# Patient Record
Sex: FEMALE | Race: WHITE | NOT HISPANIC OR LATINO | Employment: UNEMPLOYED | URBAN - METROPOLITAN AREA
[De-identification: names, ages, dates, MRNs, and addresses within clinical notes are randomized per-mention and may not be internally consistent; named-entity substitution may affect disease eponyms.]

---
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
## 2025-01-21 ENCOUNTER — TELEPHONE (OUTPATIENT)
Dept: FAMILY MEDICINE CLINIC | Facility: CLINIC | Age: 20
End: 2025-01-21

## 2025-01-21 NOTE — TELEPHONE ENCOUNTER
Need to reschedule 1/27 appointment as Dr Merida not in. Called cell#- not in service. Called home # and left message to return call.

## 2025-01-31 DIAGNOSIS — F31.81 BIPOLAR 2 DISORDER (HCC): ICD-10-CM

## 2025-01-31 RX ORDER — LAMOTRIGINE 25 MG/1
50 TABLET ORAL DAILY
Qty: 180 TABLET | Refills: 0 | Status: SHIPPED | OUTPATIENT
Start: 2025-01-31 | End: 2025-05-01

## 2025-02-06 ENCOUNTER — OFFICE VISIT (OUTPATIENT)
Dept: FAMILY MEDICINE CLINIC | Facility: CLINIC | Age: 20
End: 2025-02-06
Payer: COMMERCIAL

## 2025-02-06 VITALS
DIASTOLIC BLOOD PRESSURE: 70 MMHG | TEMPERATURE: 98.4 F | SYSTOLIC BLOOD PRESSURE: 100 MMHG | HEIGHT: 61 IN | BODY MASS INDEX: 29.57 KG/M2 | WEIGHT: 156.6 LBS | OXYGEN SATURATION: 97 % | RESPIRATION RATE: 18 BRPM | HEART RATE: 108 BPM

## 2025-02-06 DIAGNOSIS — F31.81 BIPOLAR 2 DISORDER (HCC): Primary | ICD-10-CM

## 2025-02-06 DIAGNOSIS — M79.10 MUSCLE PAIN: ICD-10-CM

## 2025-02-06 DIAGNOSIS — B35.3 TINEA PEDIS OF BOTH FEET: ICD-10-CM

## 2025-02-06 DIAGNOSIS — M54.50 LOW BACK PAIN WITHOUT SCIATICA, UNSPECIFIED BACK PAIN LATERALITY, UNSPECIFIED CHRONICITY: ICD-10-CM

## 2025-02-06 PROCEDURE — 99214 OFFICE O/P EST MOD 30 MIN: CPT | Performed by: FAMILY MEDICINE

## 2025-02-06 NOTE — PROGRESS NOTES
Angela Foreman 2005 adult MRN: [id number]    [facility] OFFICE VISIT  [facility] - [facility]      ASSESSMENT/PLAN  Angela Foreman is a 19 y.o. adult presents to the office for    Diagnoses and all orders for this visit:    Bipolar 2 disorder (HCC)    Muscle pain    Low back pain without sciatica, unspecified back pain laterality, unspecified chronicity    Tinea pedis of both feet      Bipolar type II noncompliant with his meds at times  At this time his girlfriend is in the office and I do recommend that he take this faithfully in order to get the best benefits out of the medication.  Patient is agreeable  Tinea pedis recommend Lotrimin spray and avoiding use of socks  Muscle pain recommend chiropractor or physical therapy if no improvement           Future Appointments   Date Time Provider Department Center   3/6/2025  3:15 PM Susie Mackey MD [facility]          SUBJECTIVE  CC: Follow-up      HPI:  Angela Foreman is a 19 y.o. adult who presents for an follow-up appointment.  Patient states that he has been taking his medications  At times.  Patient states that he has been getting reminders from his girlfriend and mom has been having  agitation at times.  Does complain about fungus on feet bilateral.  Patient also complaining of back pain still being persistent      Review of Systems   Constitutional:  Negative for activity change, appetite change, chills, fatigue and fever.   HENT:  Negative for congestion.    Respiratory:  Negative for cough, chest tightness and shortness of breath.    Cardiovascular:  Negative for chest pain and leg swelling.   Gastrointestinal:  Negative for abdominal distention, abdominal pain, constipation, diarrhea, nausea and vomiting.   Musculoskeletal:  Positive for back pain.   Psychiatric/Behavioral:  The patient is nervous/anxious.    All other systems reviewed and are negative.      Historical Information   The  patient history was reviewed as follows:  Past Medical History:   Diagnosis Date   • ADHD (attention deficit hyperactivity disorder)    • Allergic rhinitis    • Anxiety    • Bipolar 1 disorder, depressed, severe (HCC)    • GERD (gastroesophageal reflux disease)    • Lactose intolerance          Medications:     Current Outpatient Medications:   •  albuterol (PROVENTIL HFA,VENTOLIN HFA) 90 mcg/act inhaler, Inhale 2 puffs every 6 (six) hours as needed for wheezing or shortness of breath (Patient not taking: Reported on 12/26/2024), Disp: 18 g, Rfl: 2  •  cetirizine (ZyrTEC) 10 mg tablet, Take 10 mg by mouth daily (Patient not taking: Reported on 12/26/2024), Disp: , Rfl:   •  cyclobenzaprine (FLEXERIL) 5 mg tablet, Take 1 tablet (5 mg total) by mouth daily at bedtime (Patient not taking: Reported on 12/26/2024), Disp: 30 tablet, Rfl: 0  •  famotidine (PEPCID) 20 mg tablet, Take 1 tablet (20 mg total) by mouth 2 (two) times a day (Patient not taking: Reported on 12/26/2024), Disp: 60 tablet, Rfl: 5  •  FLUoxetine (PROzac) 20 mg capsule, Take 1 capsule (20 mg total) by mouth daily, Disp: 90 capsule, Rfl: 0  •  FLUoxetine (PROzac) 40 MG capsule, Take 60 mg by mouth daily, Disp: , Rfl:   •  fluticasone (FLONASE) 50 mcg/act nasal spray, 1 spray into each nostril daily (Patient not taking: Reported on 12/26/2024), Disp: 18.2 mL, Rfl: 0  •  hydrOXYzine HCL (ATARAX) 50 mg tablet, Take 50 mg by mouth every 8 (eight) hours as needed (Patient not taking: Reported on 12/26/2024), Disp: , Rfl:   •  lamoTRIgine (LaMICtal) 150 MG tablet, Take 150 mg by mouth daily (Patient not taking: Reported on 12/26/2024), Disp: , Rfl:   •  lamoTRIgine (LaMICtal) 25 mg tablet, Take 2 tablets (50 mg total) by mouth daily, Disp: 180 tablet, Rfl: 0  •  lurasidone (LATUDA) 40 mg tablet, Take 40 mg by mouth daily with breakfast (Patient not taking: Reported on 12/26/2024), Disp: , Rfl:   •  montelukast (SINGULAIR) 10 mg tablet, Take 1 tablet (10 mg  "total) by mouth daily at bedtime (Patient not taking: Reported on 12/26/2024), Disp: 30 tablet, Rfl: 5    Allergies   Allergen Reactions   • Penicillins GI Intolerance   • Zithromax Tri-Kael [Azithromycin] GI Intolerance   • Cat Dander Dermatitis   • Pollen Extract Allergic Rhinitis       OBJECTIVE  Vitals:   Vitals:    02/06/25 1457   BP: 100/70   BP Location: Left arm   Patient Position: Sitting   Cuff Size: Standard   Pulse: (!) 108   Resp: 18   Temp: 98.4 °F (36.9 °C)   TempSrc: Temporal   SpO2: 97%   Weight: 71 kg (156 lb 9.6 oz)   Height: 5' 1\" (1.549 m)         Physical Exam  Constitutional:       Appearance: Normal appearance.   Pulmonary:      Effort: Pulmonary effort is normal.   Musculoskeletal:         General: Tenderness present. Normal range of motion.      Comments: Upper and lower back   Neurological:      General: No focal deficit present.      Mental Status: He is alert and oriented to person, place, and time.   Psychiatric:         Mood and Affect: Mood normal.         Behavior: Behavior normal.         Thought Content: Thought content normal.         Judgment: Judgment normal.                    Susie Mackey MD,   [facility]  2/10/2025      "

## 2025-04-05 DIAGNOSIS — T78.40XS ALLERGY, SEQUELA: ICD-10-CM

## 2025-04-07 RX ORDER — MONTELUKAST SODIUM 10 MG/1
10 TABLET ORAL
Qty: 30 TABLET | Refills: 5 | Status: SHIPPED | OUTPATIENT
Start: 2025-04-07 | End: 2025-04-19 | Stop reason: SDUPTHER

## 2025-04-14 ENCOUNTER — OFFICE VISIT (OUTPATIENT)
Dept: OBGYN CLINIC | Facility: CLINIC | Age: 20
End: 2025-04-14

## 2025-04-14 VITALS
BODY MASS INDEX: 30.21 KG/M2 | WEIGHT: 160 LBS | DIASTOLIC BLOOD PRESSURE: 78 MMHG | HEIGHT: 61 IN | SYSTOLIC BLOOD PRESSURE: 110 MMHG

## 2025-04-14 DIAGNOSIS — N76.0 ACUTE VAGINITIS: Primary | ICD-10-CM

## 2025-04-14 PROBLEM — G40.909 SEIZURE DISORDER (HCC): Status: ACTIVE | Noted: 2025-04-14

## 2025-04-14 PROBLEM — F10.24 ALCOHOL DEPENDENCE WITH ALCOHOL-INDUCED MOOD DISORDER (HCC): Status: ACTIVE | Noted: 2025-04-14

## 2025-04-14 NOTE — PROGRESS NOTES
"Name: Angela Foreman      : 2005      MRN: [id number]  Encounter Provider: MARTI Cooley  Encounter Date: 2025   Encounter department: [facility] [place]  :  Assessment & Plan  Acute vaginitis  -Vaginal culture collected; will treat based on results.  -Normal pelvic exam today except for small cut at vaginal introitus and mild irritation to right labia   -Recommended A&D ointment to cut for barrier and healing  -Advised coconut oil to vulva for irritation.   Orders:    NuSwab Vaginitis Plus (VG+)        History of Present Illness   HPI  Jovany Foreman is a 19 y.o. adult who presents with vaginal concerns. Pronouns \"he/him\".    Symptoms started approximately 4-5 days ago.  He reports he is prone to yeast infections.  Denies abnormal discharge   + abnormal odor: \"off\"  + vulvar irritation  + vaginal itching   He reports feeling raw.   Patient is  sexually active, with male and female partners.   Condom use: yes  Last STI screening: n/a  History of STI/STD: no  Denies changes in lifestyle, medication, soaps, detergents.       History obtained from: patient    Review of Systems  Medical History Reviewed by provider this encounter:     .  Current Outpatient Medications on File Prior to Visit   Medication Sig Dispense Refill    albuterol (PROVENTIL HFA,VENTOLIN HFA) 90 mcg/act inhaler Inhale 2 puffs every 6 (six) hours as needed for wheezing or shortness of breath 18 g 2    famotidine (PEPCID) 20 mg tablet Take 1 tablet (20 mg total) by mouth 2 (two) times a day 60 tablet 5    FLUoxetine (PROzac) 40 MG capsule Take 60 mg by mouth daily      fluticasone (FLONASE) 50 mcg/act nasal spray 1 spray into each nostril daily 18.2 mL 0    hydrOXYzine HCL (ATARAX) 50 mg tablet Take 50 mg by mouth every 8 (eight) hours as needed      lamoTRIgine (LaMICtal) 150 MG tablet Take 150 mg by mouth daily      lurasidone (LATUDA) 40 mg tablet Take 40 mg by mouth daily with breakfast      " "montelukast (SINGULAIR) 10 mg tablet TAKE 1 TABLET BY MOUTH ONCE DAILY AT BEDTIME 30 tablet 5    cetirizine (ZyrTEC) 10 mg tablet Take 10 mg by mouth daily (Patient not taking: Reported on 4/14/2025)      cyclobenzaprine (FLEXERIL) 5 mg tablet Take 1 tablet (5 mg total) by mouth daily at bedtime (Patient not taking: Reported on 4/14/2025) 30 tablet 0    FLUoxetine (PROzac) 20 mg capsule Take 1 capsule (20 mg total) by mouth daily 90 capsule 0    lamoTRIgine (LaMICtal) 25 mg tablet Take 2 tablets (50 mg total) by mouth daily (Patient not taking: Reported on 4/14/2025) 180 tablet 0     No current facility-administered medications on file prior to visit.      Social History     Tobacco Use    Smoking status: Every Day     Current packs/day: 0.25     Types: Cigarettes    Smokeless tobacco: Never   Vaping Use    Vaping status: Never Used   Substance and Sexual Activity    Alcohol use: Yes     Comment: socially    Drug use: Yes     Types: Marijuana     Comment: last used 2 wks ago    Sexual activity: Yes     Partners: Female, Male     Birth control/protection: Condom Male        Objective   /78 (BP Location: Left arm, Patient Position: Sitting, Cuff Size: Standard)   Ht 5' 1\" (1.549 m)   Wt 72.6 kg (160 lb)   LMP 04/02/2025 (Approximate)   BMI 30.23 kg/m²      Physical Exam  Vitals and nursing note reviewed.   Constitutional:       General: He is not in acute distress.     Appearance: Normal appearance.   HENT:      Head: Normocephalic.   Eyes:      Conjunctiva/sclera: Conjunctivae normal.   Pulmonary:      Effort: No respiratory distress.   Abdominal:      General: Abdomen is flat.      Palpations: Abdomen is soft.   Genitourinary:     General: Normal vulva.      Exam position: Lithotomy position.      Pubic Area: No rash or pubic lice.       Labia:         Right: No rash or tenderness.         Left: No rash or tenderness.       Urethra: No urethral pain.      Vagina: Normal.      Cervix: Normal.      Uterus: " Normal.       Adnexa: Right adnexa normal and left adnexa normal.          Comments: Mild irritation noted on right labia. Minimal normal physiologic discharge noted in posterior vaginal vault.  Musculoskeletal:         General: Normal range of motion.      Cervical back: Normal range of motion.      Right lower leg: No edema.      Left lower leg: No edema.   Lymphadenopathy:      Lower Body: No right inguinal adenopathy. No left inguinal adenopathy.   Skin:     General: Skin is warm and dry.   Neurological:      Mental Status: He is alert and oriented to person, place, and time.   Psychiatric:         Mood and Affect: Mood normal.         Behavior: Behavior normal.         Thought Content: Thought content normal.         Judgment: Judgment normal.

## 2025-04-17 ENCOUNTER — RESULTS FOLLOW-UP (OUTPATIENT)
Dept: OBGYN CLINIC | Facility: CLINIC | Age: 20
End: 2025-04-17

## 2025-04-17 LAB
A VAGINAE DNA VAG QL NAA+PROBE: NORMAL SCORE
BVAB2 DNA VAG QL NAA+PROBE: NORMAL SCORE
C ALBICANS DNA VAG QL NAA+PROBE: NEGATIVE
C GLABRATA DNA VAG QL NAA+PROBE: NEGATIVE
C TRACH RRNA SPEC QL NAA+PROBE: NEGATIVE
MEGA1 DNA VAG QL NAA+PROBE: NORMAL SCORE
N GONORRHOEA RRNA SPEC QL NAA+PROBE: NEGATIVE
T VAGINALIS RRNA SPEC QL NAA+PROBE: NEGATIVE

## 2025-04-19 DIAGNOSIS — T78.40XS ALLERGY, SEQUELA: ICD-10-CM

## 2025-04-20 RX ORDER — MONTELUKAST SODIUM 10 MG/1
10 TABLET ORAL
Qty: 90 TABLET | Refills: 1 | Status: SHIPPED | OUTPATIENT
Start: 2025-04-20

## 2025-06-06 ENCOUNTER — OFFICE VISIT (OUTPATIENT)
Dept: URGENT CARE | Facility: CLINIC | Age: 20
End: 2025-06-06
Payer: COMMERCIAL

## 2025-06-06 VITALS
RESPIRATION RATE: 20 BRPM | OXYGEN SATURATION: 98 % | TEMPERATURE: 97.3 F | WEIGHT: 157.6 LBS | DIASTOLIC BLOOD PRESSURE: 65 MMHG | BODY MASS INDEX: 29.78 KG/M2 | HEART RATE: 92 BPM | SYSTOLIC BLOOD PRESSURE: 105 MMHG

## 2025-06-06 DIAGNOSIS — J01.00 ACUTE NON-RECURRENT MAXILLARY SINUSITIS: Primary | ICD-10-CM

## 2025-06-06 PROCEDURE — 99203 OFFICE O/P NEW LOW 30 MIN: CPT

## 2025-06-06 RX ORDER — DOXYCYCLINE 100 MG/1
100 TABLET ORAL 2 TIMES DAILY
Qty: 10 TABLET | Refills: 0 | Status: SHIPPED | OUTPATIENT
Start: 2025-06-06 | End: 2025-06-11

## 2025-06-06 RX ORDER — PREDNISONE 20 MG/1
40 TABLET ORAL DAILY
Qty: 10 TABLET | Refills: 0 | Status: SHIPPED | OUTPATIENT
Start: 2025-06-06 | End: 2025-06-11

## 2025-06-06 NOTE — PROGRESS NOTES
"  [facility]        NAME: Angela Foreman is a 19 y.o. adult  : 2005    MRN: [id number]  DATE: 2025  TIME: 7:30 PM    Assessment and Plan   Acute non-recurrent maxillary sinusitis [J01.00]  1. Acute non-recurrent maxillary sinusitis  doxycycline (ADOXA) 100 MG tablet    predniSONE 20 mg tablet          Worsening sinusitis symptoms x 8 days with severe facial pain and purulent nasal discharge. Will treat.     Patient Instructions     Supportive care with rest, adequate hydration, and warm liquids   Antibiotics as prescribed  Over the counter Tylenol/acetaminophen as needed for fever  Over the counter cold/cough medicine as needed    Follow up with PCP in 3-5 days   Go to ER if worsening symptoms    If tests are performed, our office will contact you with results only if changes need to made to the care plan discussed with you at the visit. [facility].    Chief Complaint     Chief Complaint   Patient presents with    Sore Throat     Reports sore throat, \"bad\" headache, congestion, cough with yellow-brown mucus, facial pain and burning x 8 days that has been worsening. States other members of the household are also sick with similar symptoms. Using ibuprofen. States he feels he may have a \"sinus infection.\"         History of Present Illness       Sinusitis  This is a new problem. The current episode started 1 to 4 weeks ago. The problem has been gradually worsening since onset. There has been no fever. The pain is severe. Associated symptoms include congestion, coughing, headaches, sinus pressure and a sore throat. Pertinent negatives include no chills or shortness of breath. Treatments tried: ibuprofen.       Review of Systems   Review of Systems   Constitutional:  Positive for fatigue. Negative for chills and fever.   HENT:  Positive for congestion, sinus pressure, sinus pain and sore throat. Negative for postnasal drip, rhinorrhea and trouble " swallowing.    Respiratory:  Positive for cough. Negative for chest tightness and shortness of breath.    Cardiovascular:  Negative for chest pain and palpitations.   Gastrointestinal:  Negative for abdominal pain, nausea and vomiting.   Genitourinary:  Negative for difficulty urinating.   Musculoskeletal:  Negative for myalgias.   Neurological:  Positive for headaches. Negative for dizziness.         Current Medications     Current Medications[1]    Current Allergies     Allergies as of 06/06/2025 - Reviewed 06/06/2025   Allergen Reaction Noted    Penicillins GI Intolerance 09/17/2019    Zithromax tri-roselyn [azithromycin] GI Intolerance 11/03/2022    Cat dander Dermatitis 08/10/2024    Pollen extract Allergic Rhinitis 08/10/2024            The following portions of the patient's history were reviewed and updated as appropriate: allergies, current medications, past family history, past medical history, past social history, past surgical history and problem list.     Past Medical History[2]    Past Surgical History[3]    Family History[4]      Medications have been verified.        Objective   /65   Pulse 92   Temp (!) 97.3 °F (36.3 °C) (Tympanic)   Resp 20   Wt 71.5 kg (157 lb 9.6 oz)   LMP 05/06/2025 (Approximate)   SpO2 98%   BMI 29.78 kg/m²        Physical Exam     Physical Exam  Constitutional:       General: He is not in acute distress.     Appearance: He is not ill-appearing.   HENT:      Nose: Congestion present.      Right Sinus: Maxillary sinus tenderness present. No frontal sinus tenderness.      Left Sinus: Maxillary sinus tenderness present. No frontal sinus tenderness.      Mouth/Throat:      Mouth: Mucous membranes are moist.      Pharynx: Oropharynx is clear.     Eyes:      Pupils: Pupils are equal, round, and reactive to light.       Cardiovascular:      Rate and Rhythm: Normal rate and regular rhythm.      Pulses: Normal pulses.      Heart sounds: Normal heart sounds. No murmur heard.      No gallop.   Pulmonary:      Effort: Pulmonary effort is normal. No respiratory distress.      Breath sounds: Normal breath sounds. No wheezing.   Abdominal:      General: Abdomen is flat. Bowel sounds are normal. There is no distension.      Palpations: Abdomen is soft.      Tenderness: There is no abdominal tenderness.     Musculoskeletal:         General: Normal range of motion.      Cervical back: Normal range of motion.     Skin:     General: Skin is warm and dry.      Capillary Refill: Capillary refill takes less than 2 seconds.     Neurological:      Mental Status: He is alert and oriented to person, place, and time.                        [1]   Current Outpatient Medications:     albuterol (PROVENTIL HFA,VENTOLIN HFA) 90 mcg/act inhaler, Inhale 2 puffs every 6 (six) hours as needed for wheezing or shortness of breath, Disp: 18 g, Rfl: 2    cetirizine (ZyrTEC) 10 mg tablet, Take 10 mg by mouth daily, Disp: , Rfl:     cyclobenzaprine (FLEXERIL) 5 mg tablet, Take 1 tablet (5 mg total) by mouth daily at bedtime, Disp: 30 tablet, Rfl: 0    doxycycline (ADOXA) 100 MG tablet, Take 1 tablet (100 mg total) by mouth 2 (two) times a day for 5 days, Disp: 10 tablet, Rfl: 0    famotidine (PEPCID) 20 mg tablet, Take 1 tablet (20 mg total) by mouth 2 (two) times a day, Disp: 60 tablet, Rfl: 5    FLUoxetine (PROzac) 20 mg capsule, Take 1 capsule (20 mg total) by mouth daily, Disp: 90 capsule, Rfl: 0    FLUoxetine (PROzac) 40 MG capsule, Take 60 mg by mouth in the morning., Disp: , Rfl:     fluticasone (FLONASE) 50 mcg/act nasal spray, 1 spray into each nostril daily, Disp: 18.2 mL, Rfl: 0    hydrOXYzine HCL (ATARAX) 50 mg tablet, Take 50 mg by mouth every 8 (eight) hours as needed, Disp: , Rfl:     lamoTRIgine (LaMICtal) 150 MG tablet, Take 150 mg by mouth in the morning., Disp: , Rfl:     lurasidone (LATUDA) 40 mg tablet, Take 40 mg by mouth daily with breakfast, Disp: , Rfl:     montelukast (SINGULAIR) 10 mg tablet, Take  1 tablet (10 mg total) by mouth daily at bedtime, Disp: 90 tablet, Rfl: 1    predniSONE 20 mg tablet, Take 2 tablets (40 mg total) by mouth daily for 5 days, Disp: 10 tablet, Rfl: 0    lamoTRIgine (LaMICtal) 25 mg tablet, Take 2 tablets (50 mg total) by mouth daily (Patient not taking: Reported on 4/14/2025), Disp: 180 tablet, Rfl: 0  [2]   Past Medical History:  Diagnosis Date    ADHD (attention deficit hyperactivity disorder)     Allergic rhinitis     Anxiety     Bipolar 1 disorder, depressed, severe (HCC)     GERD (gastroesophageal reflux disease)     Lactose intolerance    [3] No past surgical history on file.  [4]   Family History  Problem Relation Name Age of Onset    Asthma Mother      Miscarriages / Stillbirths Mother      Bipolar disorder Mother      Anxiety disorder Mother      Heart disease Father      Hypertension Father      ADD / ADHD Brother      Learning disabilities Brother      Anxiety disorder Brother      Heart disease Maternal Uncle      ADD / ADHD Maternal Uncle      Hypertension Maternal Uncle      Cancer Paternal Aunt      Asthma Maternal Grandmother      Heart disease Maternal Grandmother      Hypertension Maternal Grandmother      Inflammatory bowel disease Maternal Grandmother      Irritable bowel syndrome Maternal Grandmother      Mental illness Maternal Grandmother      Stroke Maternal Grandmother      Vision loss Maternal Grandmother      Asthma Paternal Grandmother      Colon polyps Paternal Grandmother      Diabetes Paternal Grandmother      Heart disease Paternal Grandmother      Hypertension Paternal Grandmother      Stroke Paternal Grandmother      Vision loss Paternal Grandmother      Cancer Paternal Grandfather

## 2025-07-11 ENCOUNTER — OFFICE VISIT (OUTPATIENT)
Dept: URGENT CARE | Facility: CLINIC | Age: 20
End: 2025-07-11
Payer: COMMERCIAL

## 2025-07-11 VITALS
BODY MASS INDEX: 29.85 KG/M2 | HEART RATE: 89 BPM | RESPIRATION RATE: 18 BRPM | TEMPERATURE: 98.5 F | SYSTOLIC BLOOD PRESSURE: 116 MMHG | WEIGHT: 158 LBS | DIASTOLIC BLOOD PRESSURE: 78 MMHG | OXYGEN SATURATION: 98 %

## 2025-07-11 DIAGNOSIS — L23.7 POISON IVY DERMATITIS: Primary | ICD-10-CM

## 2025-07-11 PROCEDURE — 99213 OFFICE O/P EST LOW 20 MIN: CPT | Performed by: PHYSICIAN ASSISTANT

## 2025-07-11 RX ORDER — CLOBETASOL PROPIONATE 0.5 MG/G
CREAM TOPICAL 2 TIMES DAILY
Qty: 30 G | Refills: 0 | Status: SHIPPED | OUTPATIENT
Start: 2025-07-11

## 2025-07-11 RX ORDER — METHYLPREDNISOLONE 4 MG/1
TABLET ORAL
Qty: 1 EACH | Refills: 0 | Status: SHIPPED | OUTPATIENT
Start: 2025-07-11

## 2025-07-11 NOTE — PROGRESS NOTES
[facility]        NAME: Angela Foreman is a 19 y.o. adult  : 2005    MRN: [id number]  DATE: 2025  TIME: 5:24 PM    Assessment and Plan   Poison ivy dermatitis [L23.7]  1. Poison ivy dermatitis  methylPREDNISolone 4 MG tablet therapy pack    clobetasol (TEMOVATE) 0.05 % cream            Patient Instructions   Poison Ivy Dermatitis:   -The patients hx and presentation is consistent with poison ivy dermatitis. We discussed that the area on the forearm may be very inflamed, no sign of cellulitis at this time. Will treat with Medrol Dose Kael to be taken as prescribed. Take with food.   -Stay well hydrated  -Cool compress for comfort   -Clobetasol topically for comfort and reduction of itching  -Benadryl for itching as discussed before bed and claritin/allegra during the day. The patient does have an rx for hydroxyzine that they can take instead of the benadryl for the itching.   -Oatmeal bath for comfort   -Keep the rash clean and dry otherwise. Monitor for signs of infection.   -If your rash worsens or persist see your PCP for follow up or consider Dermatology follow up. Red flag signs discussed.         Follow up with PCP in 3-5 days.  Proceed to  ER if symptoms worsen.    If tests have been performed at [facility], our office will contact you with results if changes need to be made to the care plan discussed with you at the visit.  You can review your full results on [facility].    Chief Complaint     Chief Complaint   Patient presents with    Rash     Pt states rash on going 4 to 5 days  pt  left  arm  and a different rash  all over 2 weeks.  Pt used Ivy rest.          History of Present Illness       The patient presents today for concern over rash x 5 days. The patient states that the rash first began over the dorsal aspect of the left forearm and has now been more erythematous and raised. There is no oozing or drainage. The rash is highly pruritic. The patient states that they  did have poison ivy last week. The patient states they have been using Ivy rest. The patient has no fever or chills. No recent travel or sick contacts. No URI sx. No facial swelling. The patient was on Doxycycline 6/6/25 and was on it for seven days for a sinusitis.         Review of Systems   Review of Systems   Constitutional:  Negative for activity change, appetite change, chills, diaphoresis, fatigue and fever.   HENT:  Negative for facial swelling, sore throat and trouble swallowing.    Respiratory:  Negative for chest tightness, shortness of breath, wheezing and stridor.    Cardiovascular:  Negative for chest pain and palpitations.   Skin:  Positive for rash.   Allergic/Immunologic: Negative for environmental allergies, food allergies and immunocompromised state.   Neurological:  Negative for dizziness and light-headedness.   Hematological:  Negative for adenopathy. Does not bruise/bleed easily.         Current Medications     Current Medications[1]    Current Allergies     Allergies as of 07/11/2025 - Reviewed 07/11/2025   Allergen Reaction Noted    Penicillins GI Intolerance 09/17/2019    Zithromax tri-roselyn [azithromycin] GI Intolerance 11/03/2022    Cat dander Dermatitis 08/10/2024    Pollen extract Allergic Rhinitis 08/10/2024            The following portions of the patient's history were reviewed and updated as appropriate: allergies, current medications, past family history, past medical history, past social history, past surgical history and problem list.     Past Medical History[2]    Past Surgical History[3]    Family History[4]      Medications have been verified.        Objective   /78 (Patient Position: Sitting)   Pulse 89   Temp 98.5 °F (36.9 °C) (Tympanic)   Resp 18   Wt 71.7 kg (158 lb)   SpO2 98%   BMI 29.85 kg/m²   No LMP recorded.       Physical Exam     Physical Exam  Vitals and nursing note reviewed.   Constitutional:       General: He is not in acute distress.     Appearance:  He is well-developed. He is not ill-appearing, toxic-appearing or diaphoretic.   HENT:      Mouth/Throat:      Pharynx: Uvula midline.     Cardiovascular:      Rate and Rhythm: Normal rate and regular rhythm.      Pulses: Normal pulses.      Heart sounds: Normal heart sounds, S1 normal and S2 normal. No murmur heard.  Pulmonary:      Effort: Pulmonary effort is normal. No tachypnea, accessory muscle usage or respiratory distress.      Breath sounds: Normal breath sounds. No stridor. No decreased breath sounds, wheezing, rhonchi or rales.     Skin:     General: Skin is warm and dry.      Capillary Refill: Capillary refill takes less than 2 seconds.      Findings: Rash present. Rash is macular and papular.      Comments: There is an erythematous, raised, maculopapular rash over the dorsal aspect of the forearm which extends from the elbow to the wrist. There is no vesicular or pustular lesions seen. No oozing or drainage. The area is warm to touch but does not appear cellulitic, it appears to be very inflamed. The patient does have other scattered lesions on her right shin and over the facial area that is consistent with poison ivy as it is vesicular.                         [1]   Current Outpatient Medications:     albuterol (PROVENTIL HFA,VENTOLIN HFA) 90 mcg/act inhaler, Inhale 2 puffs every 6 (six) hours as needed for wheezing or shortness of breath, Disp: 18 g, Rfl: 2    cetirizine (ZyrTEC) 10 mg tablet, Take 10 mg by mouth in the morning., Disp: , Rfl:     clobetasol (TEMOVATE) 0.05 % cream, Apply topically 2 (two) times a day, Disp: 30 g, Rfl: 0    cyclobenzaprine (FLEXERIL) 5 mg tablet, Take 1 tablet (5 mg total) by mouth daily at bedtime, Disp: 30 tablet, Rfl: 0    famotidine (PEPCID) 20 mg tablet, Take 1 tablet (20 mg total) by mouth 2 (two) times a day, Disp: 60 tablet, Rfl: 5    FLUoxetine (PROzac) 40 MG capsule, Take 60 mg by mouth in the morning., Disp: , Rfl:     fluticasone (FLONASE) 50 mcg/act nasal  spray, 1 spray into each nostril daily, Disp: 18.2 mL, Rfl: 0    hydrOXYzine HCL (ATARAX) 50 mg tablet, Take 50 mg by mouth every 8 (eight) hours as needed, Disp: , Rfl:     lamoTRIgine (LaMICtal) 150 MG tablet, Take 150 mg by mouth in the morning., Disp: , Rfl:     lurasidone (LATUDA) 40 mg tablet, Take 40 mg by mouth daily with breakfast, Disp: , Rfl:     methylPREDNISolone 4 MG tablet therapy pack, Use as directed on package, Disp: 1 each, Rfl: 0    montelukast (SINGULAIR) 10 mg tablet, Take 1 tablet (10 mg total) by mouth daily at bedtime, Disp: 90 tablet, Rfl: 1    FLUoxetine (PROzac) 20 mg capsule, Take 1 capsule (20 mg total) by mouth daily, Disp: 90 capsule, Rfl: 0    lamoTRIgine (LaMICtal) 25 mg tablet, Take 2 tablets (50 mg total) by mouth daily (Patient not taking: Reported on 4/14/2025), Disp: 180 tablet, Rfl: 0  [2]   Past Medical History:  Diagnosis Date    ADHD (attention deficit hyperactivity disorder)     Allergic rhinitis     Anxiety     Bipolar 1 disorder, depressed, severe (HCC)     GERD (gastroesophageal reflux disease)     Lactose intolerance    [3] No past surgical history on file.  [4]   Family History  Problem Relation Name Age of Onset    Asthma Mother      Miscarriages / Stillbirths Mother      Bipolar disorder Mother      Anxiety disorder Mother      Heart disease Father      Hypertension Father      ADD / ADHD Brother      Learning disabilities Brother      Anxiety disorder Brother      Heart disease Maternal Uncle      ADD / ADHD Maternal Uncle      Hypertension Maternal Uncle      Cancer Paternal Aunt      Asthma Maternal Grandmother      Heart disease Maternal Grandmother      Hypertension Maternal Grandmother      Inflammatory bowel disease Maternal Grandmother      Irritable bowel syndrome Maternal Grandmother      Mental illness Maternal Grandmother      Stroke Maternal Grandmother      Vision loss Maternal Grandmother      Asthma Paternal Grandmother      Colon polyps Paternal  Grandmother      Diabetes Paternal Grandmother      Heart disease Paternal Grandmother      Hypertension Paternal Grandmother      Stroke Paternal Grandmother      Vision loss Paternal Grandmother      Cancer Paternal Grandfather

## 2025-07-11 NOTE — PATIENT INSTRUCTIONS
Poison Ivy Dermatitis:   -The patients hx and presentation is consistent with poison ivy dermatitis. We discussed that the area on the forearm may be very inflamed, no sign of cellulitis at this time. Will treat with Medrol Dose Kael to be taken as prescribed. Take with food.   -Stay well hydrated  -Cool compress for comfort   -Clobetasol topically for comfort and reduction of itching  -Benadryl for itching as discussed before bed and claritin/allegra during the day. The patient does have an rx for hydroxyzine that they can take instead of the benadryl for the itching.   -Oatmeal bath for comfort   -Keep the rash clean and dry otherwise. Monitor for signs of infection.   -If your rash worsens or persist see your PCP for follow up or consider Dermatology follow up. Red flag signs discussed.

## 2025-07-14 ENCOUNTER — OFFICE VISIT (OUTPATIENT)
Dept: FAMILY MEDICINE CLINIC | Facility: CLINIC | Age: 20
End: 2025-07-14
Payer: COMMERCIAL

## 2025-07-14 VITALS
RESPIRATION RATE: 12 BRPM | DIASTOLIC BLOOD PRESSURE: 70 MMHG | HEART RATE: 85 BPM | TEMPERATURE: 97.8 F | BODY MASS INDEX: 29.45 KG/M2 | SYSTOLIC BLOOD PRESSURE: 110 MMHG | HEIGHT: 61 IN | OXYGEN SATURATION: 99 % | WEIGHT: 156 LBS

## 2025-07-14 DIAGNOSIS — L24.9 IRRITANT CONTACT DERMATITIS, UNSPECIFIED TRIGGER: Primary | ICD-10-CM

## 2025-07-14 DIAGNOSIS — B96.89 BACTERIAL SKIN INFECTION: ICD-10-CM

## 2025-07-14 DIAGNOSIS — G40.909 SEIZURE DISORDER (HCC): ICD-10-CM

## 2025-07-14 DIAGNOSIS — Z78.9 FEMALE-TO-MALE TRANSGENDER PERSON: ICD-10-CM

## 2025-07-14 DIAGNOSIS — L08.9 BACTERIAL SKIN INFECTION: ICD-10-CM

## 2025-07-14 PROCEDURE — 99214 OFFICE O/P EST MOD 30 MIN: CPT | Performed by: STUDENT IN AN ORGANIZED HEALTH CARE EDUCATION/TRAINING PROGRAM

## 2025-07-14 RX ORDER — CEFADROXIL 500 MG/1
500 CAPSULE ORAL EVERY 12 HOURS SCHEDULED
Qty: 14 CAPSULE | Refills: 0 | Status: SHIPPED | OUTPATIENT
Start: 2025-07-14 | End: 2025-07-16

## 2025-07-14 RX ORDER — TRIAMCINOLONE ACETONIDE 1 MG/G
CREAM TOPICAL 2 TIMES DAILY
Qty: 30 G | Refills: 0 | Status: SHIPPED | OUTPATIENT
Start: 2025-07-14

## 2025-07-14 NOTE — PROGRESS NOTES
Name: Angela Foreman      : 2005      MRN: [id number]  Encounter Provider: Johnnie Augustin DO  Encounter Date: 2025   Encounter department: [facility]  Assessment & Plan  Irritant contact dermatitis, unspecified trigger  Symptoms left arm appear to be dermatitis, concern for now bacterial infection as well?  Recommend Kenalog cream twice daily for 3 days, monitor for resolution of symptoms.    If symptoms do not improve with steroid/antibiotic therapy may consider clotrimazole antifungal but very low suspicion for fungal infection at this time.    Orders:  •  triamcinolone (KENALOG) 0.1 % cream; Apply topically 2 (two) times a day    Seizure disorder (HCC)  Continue medication as prescribed       Female-to-male transgender person         Bacterial skin infection  Concern for underlying bacterial skin infection, recommend cefadroxil x 7 days  Orders:  •  cefadroxil (DURICEF) 500 mg capsule; Take 1 capsule (500 mg total) by mouth every 12 (twelve) hours for 7 days          Depression Screening and Follow-up Plan: Patient's depression screening was positive with a PHQ-2 score of 3. Their PHQ-9 score was 8.   Patient assessed for underlying major depression. Brief counseling provided and recommend additional follow-up/re-evaluation next office visit.       History of Present Illness   Follow-up visit      Review of Systems   Constitutional:  Negative for chills, fatigue and fever.   HENT:  Negative for congestion and sore throat.    Eyes:  Negative for pain and visual disturbance.   Respiratory:  Negative for shortness of breath and wheezing.    Cardiovascular:  Negative for chest pain and palpitations.   Gastrointestinal:  Negative for abdominal pain, constipation, diarrhea, nausea and vomiting.   Genitourinary:  Negative for dysuria and frequency.   Musculoskeletal:  Negative for back pain and neck pain.   Skin:  Positive for rash. Negative for color change.  "  Neurological:  Negative for dizziness and headaches.   Psychiatric/Behavioral:  Negative for agitation and confusion.    All other systems reviewed and are negative.      Objective   /70 (BP Location: Left arm, Patient Position: Sitting, Cuff Size: Standard)   Pulse 85   Temp 97.8 °F (36.6 °C) (Temporal)   Resp 12   Ht 5' 1\" (1.549 m)   Wt 70.8 kg (156 lb)   LMP 07/07/2025 (Exact Date)   SpO2 99%   BMI 29.48 kg/m²      Physical Exam  Vitals and nursing note reviewed.   Constitutional:       General: He is not in acute distress.     Appearance: He is well-developed.   HENT:      Head: Normocephalic and atraumatic.     Eyes:      General: No scleral icterus.     Conjunctiva/sclera: Conjunctivae normal.       Cardiovascular:      Rate and Rhythm: Normal rate and regular rhythm.      Pulses: Normal pulses.      Heart sounds: No murmur heard.  Pulmonary:      Effort: Pulmonary effort is normal. No respiratory distress.      Breath sounds: Normal breath sounds.   Abdominal:      General: There is no distension.      Palpations: Abdomen is soft.      Tenderness: There is no abdominal tenderness.     Musculoskeletal:         General: No swelling. Normal range of motion.      Cervical back: Neck supple.     Skin:     General: Skin is warm and dry.      Capillary Refill: Capillary refill takes less than 2 seconds.      Findings: Rash present.     Neurological:      General: No focal deficit present.      Mental Status: He is alert and oriented to person, place, and time. Mental status is at baseline.     Psychiatric:         Mood and Affect: Mood normal.         Behavior: Behavior normal.         "

## 2025-07-16 RX ORDER — CEFADROXIL 500 MG/1
CAPSULE ORAL
Qty: 14 CAPSULE | Refills: 0 | Status: SHIPPED | OUTPATIENT
Start: 2025-07-16 | End: 2025-07-23

## 2025-07-16 NOTE — TELEPHONE ENCOUNTER
Called pharmacy and spoke with Barbara who advised patient already picked up medication. Called patient and left detailed message on voicemail.    No further action needed.

## 2025-07-16 NOTE — TELEPHONE ENCOUNTER
Patient has a history of tolerating keflex. He saw Charli SHARMA.  If a rash develops he needs to let us know

## 2025-07-25 DIAGNOSIS — T78.40XS ALLERGY, SEQUELA: ICD-10-CM

## 2025-07-25 DIAGNOSIS — F31.81 BIPOLAR 2 DISORDER (HCC): ICD-10-CM

## 2025-07-28 RX ORDER — MONTELUKAST SODIUM 10 MG/1
10 TABLET ORAL
Qty: 90 TABLET | Refills: 1 | Status: SHIPPED | OUTPATIENT
Start: 2025-07-28

## 2025-07-29 RX ORDER — LAMOTRIGINE 25 MG/1
50 TABLET ORAL DAILY
Qty: 180 TABLET | Refills: 0 | Status: SHIPPED | OUTPATIENT
Start: 2025-07-29

## 2025-07-29 RX ORDER — LAMOTRIGINE 25 MG/1
50 TABLET ORAL DAILY
Qty: 180 TABLET | Refills: 0 | Status: SHIPPED | OUTPATIENT
Start: 2025-07-29 | End: 2025-10-27

## 2025-08-11 ENCOUNTER — OFFICE VISIT (OUTPATIENT)
Dept: FAMILY MEDICINE CLINIC | Facility: CLINIC | Age: 20
End: 2025-08-11
Payer: COMMERCIAL